# Patient Record
Sex: FEMALE | Race: WHITE | Employment: OTHER | URBAN - METROPOLITAN AREA
[De-identification: names, ages, dates, MRNs, and addresses within clinical notes are randomized per-mention and may not be internally consistent; named-entity substitution may affect disease eponyms.]

---

## 2017-06-06 ENCOUNTER — HOSPITAL ENCOUNTER (OUTPATIENT)
Dept: PHYSICAL THERAPY | Age: 77
Discharge: HOME | End: 2017-06-06
Payer: MEDICARE

## 2017-06-06 PROCEDURE — G8979 MOBILITY GOAL STATUS: HCPCS

## 2017-06-06 PROCEDURE — G8978 MOBILITY CURRENT STATUS: HCPCS

## 2017-06-06 PROCEDURE — 97162 PT EVAL MOD COMPLEX 30 MIN: CPT

## 2017-06-06 PROCEDURE — 97110 THERAPEUTIC EXERCISES: CPT

## 2017-06-06 NOTE — PROGRESS NOTES
PHYSICAL THERAPY - DAILY TREATMENT NOTE    Patient Name: Suhail Forde        Date: 2017  : 1940   YES Patient  Verified  Visit #:     Insurance: Payor: Jose Jenkins / Plan: [place] MEDICARE PART A & B / Product Type: Medicare /      In time: 335 Out time: 400   Total Treatment Time: 25     Medicare Time Tracking (below)   Total Timed Codes (min):  25 1:1 Treatment Time:  25     TREATMENT AREA =  (B) knee pain chronic    SUBJECTIVE    Pain Level (on 0 to 10 scale):  ie  / 10   Medication Changes/New allergies or changes in medical history, any new surgeries or procedures? NO    If yes, update Summary List   Subjective Functional Status/Changes:  []  No changes reported     See POC          OBJECTIVE     min Patient Education:  YES  Reviewed HEP   []  Progressed/Changed HEP based on: Other Objective/Functional Measures:    Shown and Performed HEP.      Post Treatment Pain Level (on 0 to 10) scale:   ie  / 10     ASSESSMENT    []  See Progress Note/Recertification   Patient will continue to benefit from skilled therapy to address remaining functional deficits: See POC   Progress toward goals / Updated goals:    See POC     PLAN    []  Upgrade activities as tolerated YES Continue plan of care   []  Discharge due to :    []  Other:      Therapist: Carlos Barrientos DPT, CIMT    Date: 2017 Time: 4:14 PM

## 2017-06-06 NOTE — PROGRESS NOTES
[facility] PHYSICAL THERAPY  [address] [place] [address], [address],[place], [address] [facility] - Phone: [phone]  Fax: [phone] / [address] [facility]  Patient Name: Louann Gaspar : 1940   Treatment   Diagnosis: (B) knee pain  Medical   Diagnosis: Bilateral knee pain [M25.561, M25.562]  Osteoarthritis of both knees [M17.0]   Onset Date: Chronic     Referral Source: Chandrika Torres Start of Care [facility]): 2017   Prior Hospitalization: See medical history Provider #: [id number]   Prior Level of Function: Limited ADLs and leisure activities due to chronic LBP   Comorbidities: OA. HTN, Hypothyroidism, Heart Disease    Medications: Verified on Patient Summary List   The Plan of Care and following information is based on the information from the initial evaluation.   ==================================================================================  Assessment / key information: Pt is a 78 yo female presents with hx of chronic knee pain . She presents with pain ranging from 0-6/10, located (B) knees. Pain is made worse with activity, transitional movements, better with rest, meds. Palpation reveals TTP increase to medial joint line, peripatellar region, rectus femoris, medial hamstring. Gait: antalgic with decreased weight bearing (R) > (L). Knee AROM/PROM: flexion 105 deg, extension 0 deg. Ankle AROM DF:  0 deg. LE MMT: hip flex 4/5, abd 3/5, add 4/5, ext 3/5, knee flex 4/5, ext 4/5, ankle 4/5. Sensation is decreased to light touch in the LE for stocking distribution. Patellar mobility is hypomobile for inferior and medial glide, medial tilt. HS 90/90 25 deg.  (+) Special tests include: Na. (-) Special tests include: Na. Pt is limited in the following activities: squatting, stooping, kneeling, performing ADLs.   Pt will benefit from PT interventions to address the aforementioned deficits and allow pt to return to PLOF. Eval Complexity: History HIGH Complexity :3+ comorbidities / personal factors will impact the outcome/ POC ;  Examination  MEDIUM Complexity : 3 Standardized tests and measures addressing body structure, function, activity limitation and / or participation in recreation ; Presentation MEDIUM Complexity : Evolving with changing characteristics ; Decision Making MEDIUM Complexity : FOTO score of 26-74; Overall Complexity MEDIUM   ==================================================================================  Problem List: pain affecting function, decrease ROM, decrease strength, impaired gait/ balance, decrease ADL/ functional abilitiies, decrease activity tolerance, decrease flexibility/ joint mobility and decrease transfer abilities   Treatment Plan may include any combination of the following: Therapeutic exercise, Therapeutic activities, Neuromuscular re-education, Physical agent/modality, Gait/balance training, Manual therapy and Patient education  Patient / Family readiness to learn indicated by: asking questions, trying to perform skills and interest  Persons(s) to be included in education: patient (P)  Barriers to Learning/Limitations: no  Measures taken:    Patient Goal (s): Decrease pain   Patient self reported health status: fair  Rehabilitation Potential: fair   Short Term Goals: To be accomplished in  2-3  weeks:  1. Pt will be independent and compliant with HEP to decrease pain, increase ROM and return pt to PLOF. 2. Pt will note pain less than or equal to 5/10 at worst to allow increase in functional abilities. 3. Pt will demonstrates increase in standing tolerance > 30 mins to allow ability to perform ADLs   Long Term Goals: To be accomplished in  4-6  weeks:  1. Increase score on FOTO by > or = to 8 points to demo an increase in functional activity tolerance with the LE. 2. Pt will note < or = 2/10 pain with all mobility to improve comfort with ADLs.    3. Pt will demonstrate GROC >/= 4+ to allow increase in ADL tolerance   Frequency / Duration:   Patient to be seen  2-3  times per week for 4-6  weeks:  Patient / Caregiver education and instruction: self care, activity modification and exercises  G-Codes (GP): Mobility [id number] Current  CL= 60-79%   Goal  CK= 40-59%. The severity rating is based on the FOTO Score    Therapist Signature: Chetan Swift DPT, CIMT Date: 9/3/2057   Certification Period: 6/6/17-8/29/17 Time: 4:00 PM   ===========================================================================================  I certify that the above Physical Therapy Services are being furnished while the patient is under my care. I agree with the treatment plan and certify that this therapy is necessary. Physician Signature:        Date:       Time:     Please sign and return to [facility] or you may fax the signed copy to [phone]. Thank you.

## 2017-06-09 ENCOUNTER — HOSPITAL ENCOUNTER (OUTPATIENT)
Dept: PHYSICAL THERAPY | Age: 77
Discharge: HOME | End: 2017-06-09
Payer: MEDICARE

## 2017-06-09 PROCEDURE — 97140 MANUAL THERAPY 1/> REGIONS: CPT

## 2017-06-09 PROCEDURE — 97110 THERAPEUTIC EXERCISES: CPT

## 2017-06-09 NOTE — PROGRESS NOTES
PHYSICAL THERAPY - DAILY TREATMENT NOTE    Patient Name: Jamila Wells        Date: 2017  : 1940   YES Patient  Verified  Visit #:      of   12  Insurance: Payor: Sharmin Shake / Plan: [place] MEDICARE PART A & B / Product Type: Medicare /      In time: 315 Out time: 4   Total Treatment Time: 45     Medicare Time Tracking (below)   Total Timed Codes (min):  45 1:1 Treatment Time:  45     TREATMENT AREA =  Bilateral knee pain [M25.561, M25.562]  Osteoarthritis of both knees [M17.0]    SUBJECTIVE  Pain Level (on 0 to 10 scale):  2  / 10   Medication Changes/New allergies or changes in medical history, any new surgeries or procedures? NO    If yes, update Summary List   Subjective Functional Status/Changes:  []  No changes reported     Pt states she can usually maintain her breathing and not have any trouble. Did not bring in any breathing apparatus and states that she does not consistently use a breathing apparatus. OBJECTIVE  35 min Therapeutic Exercise:  [x]  See flow sheet   Rationale:      increase ROM, increase strength, improve coordination and improve balance to improve the patients ability to perform pain free ADLs. 10 Min Manual Therapy: STM (B) quad and HS. Rationale:      decrease pain, increase ROM and increase tissue extensibility to improve patient's ability to ambulate. min Patient Education:  YES  Reviewed HEP   []  Progressed/Changed HEP based on: Other Objective/Functional Measures: Added multiple exercises to improve LE strength and stability for ADLs. See flow sheet. TTP along (B) quad, (R) > (L). Pulse ox taken throughout session, no reading below [id number]     Post Treatment Pain Level (on 0 to 10) scale:   2  / 10     ASSESSMENT  Assessment/Changes in Function:     Good tolerance to initial therex.        []  See Progress Note/Recertification   Patient will continue to benefit from skilled PT services to modify and progress therapeutic interventions, address functional mobility deficits, address ROM deficits, address strength deficits, analyze and address soft tissue restrictions, analyze and cue movement patterns, analyze and modify body mechanics/ergonomics and assess and modify postural abnormalities to attain remaining goals. Progress toward goals / Updated goals:    Initiated therex.        PLAN  [x]  Upgrade activities as tolerated YES Continue plan of care   []  Discharge due to :    []  Other:      Therapist: Tamy Deutsch PTA    Date: 6/9/2017 Time: 4:35 PM     Future Appointments  Date Time Provider Reba Goins   6/19/2017 3:30 PM Joaquin Cevallos [facility]   6/21/2017 3:30 PM Joaquin Cevallos [facility]   6/23/2017 11:30 AM Tamy Deutsch [facility]   6/26/2017 2:30 PM Joaquin Cevallos [facility]   6/28/2017 3:30 PM Joaquin Cevallos [facility]   6/30/2017 2:30 PM Isabelle Gold, PT [facility]

## 2017-06-19 ENCOUNTER — APPOINTMENT (OUTPATIENT)
Dept: PHYSICAL THERAPY | Age: 77
End: 2017-06-19
Payer: MEDICARE

## 2017-06-21 ENCOUNTER — HOSPITAL ENCOUNTER (OUTPATIENT)
Dept: PHYSICAL THERAPY | Age: 77
Discharge: HOME | End: 2017-06-21
Payer: MEDICARE

## 2017-06-21 PROCEDURE — 97110 THERAPEUTIC EXERCISES: CPT

## 2017-06-21 PROCEDURE — 97140 MANUAL THERAPY 1/> REGIONS: CPT

## 2017-06-21 NOTE — PROGRESS NOTES
PHYSICAL THERAPY - DAILY TREATMENT NOTE    Patient Name: Becky Regan        Date: 2017  : 1940   YES Patient  Verified  Visit #:   3   of   12  Insurance: Payor: Cathleen Camel / Plan: [place] MEDICARE PART A & B / Product Type: Medicare /      In time: 330 Out time: 410   Total Treatment Time: 40     Medicare Time Tracking (below)   Total Timed Codes (min):  40 1:1 Treatment Time:  40     TREATMENT AREA =  Bilateral knee pain [M25.561, M25.562]  Osteoarthritis of both knees [M17.0]    SUBJECTIVE  Pain Level (on 0 to 10 scale):  0  / 10   Medication Changes/New allergies or changes in medical history, any new surgeries or procedures? NO    If yes, update Summary List   Subjective Functional Status/Changes:  []  No changes reported     Reports no pain in knees but very stiff. Patient walked into clinic without use of supplemental oxygen. Patient stated that she cold control her breathing for intake of O2         OBJECTIVE    30 min Therapeutic Exercise:  [x]  See flow sheet   Rationale:      increase ROM and increase strength to improve the patients ability to perform pain free ADLs. 10 min Manual Therapy: STM (B) quad and HS. Rationale:      decrease pain, increase ROM and increase tissue extensibility to improve patient's ability to perform pain free ADLs. min Patient Education:  YES  Reviewed HEP   []  Progressed/Changed HEP based on: Other Objective/Functional Measures:    Continue with therx per flow sheet. Patient tender to posterior and anterior portions of B knee. Post Treatment Pain Level (on 0 to 10) scale:   0  / 10     ASSESSMENT  Assessment/Changes in Function:     No change in function noted today.    []  See Progress Note/Recertification   Patient will continue to benefit from skilled PT services to modify and progress therapeutic interventions, address functional mobility deficits, address ROM deficits, address strength deficits, analyze and address soft tissue restrictions and analyze and cue movement patterns to attain remaining goals.    Progress toward goals / Updated goals:    No change toward goals today     PLAN  []  Upgrade activities as tolerated YES Continue plan of care   []  Discharge due to :    []  Other:      Therapist: URBANO Badillo    Date: 6/21/2017 Time: 7:03 AM       Future Appointments  Date Time Provider Reba Goins   6/21/2017 3:30 PM Melia De Jesus [facility]   6/23/2017 11:30 AM Tamy Deutsch [facility]   6/26/2017 2:30 PM Joaquin Cevallos PTA [facility]   6/28/2017 3:30 PM Joaquin Cevallos PTA [facility]   6/30/2017 2:30 PM Isabelle Gold, PT [facility]

## 2017-06-23 ENCOUNTER — HOSPITAL ENCOUNTER (OUTPATIENT)
Dept: PHYSICAL THERAPY | Age: 77
Discharge: HOME | End: 2017-06-23
Payer: MEDICARE

## 2017-06-23 PROCEDURE — 97110 THERAPEUTIC EXERCISES: CPT

## 2017-06-23 PROCEDURE — 97140 MANUAL THERAPY 1/> REGIONS: CPT

## 2017-06-23 NOTE — PROGRESS NOTES
PHYSICAL THERAPY - DAILY TREATMENT NOTE    Patient Name: Rene Boyer        Date: 2017  : 1940   YES Patient  Verified  Visit #:      12  Insurance: Payor: Camnetta Dancer / Plan: [place] MEDICARE PART A & B / Product Type: Medicare /      In time: 6673 Out time:    Total Treatment Time: 55     Medicare Time Tracking (below)   Total Timed Codes (min):  45 1:1 Treatment Time:  40     TREATMENT AREA =  Bilateral knee pain [M25.561, M25.562]  Osteoarthritis of both knees [M17.0]    SUBJECTIVE  Pain Level (on 0 to 10 scale):  5  / 10   Medication Changes/New allergies or changes in medical history, any new surgeries or procedures? NO    If yes, update Summary List   Subjective Functional Status/Changes:  []  No changes reported     Pt reporting some soreness after the last session and it's still lingering a little today. OBJECTIVE  Modalities Rationale:     decrease inflammation and decrease pain to improve patient's ability to perform pain free ADLs. min [] Estim, type/location:                                      []  att     []  unatt     []  w/US     []  w/ice    []  w/heat    min []  Mechanical Traction: type/lbs                   []  pro   []  sup   []  int   []  cont    []  before manual    []  after manual    min []  Ultrasound, settings/location:      min []  Iontophoresis w/ dexamethasone, location:                                               []  take home patch       []  in clinic   10 min [x]  Ice     []  Heat    location/position: Supine, (B) knees. min []  Vasopneumatic Device, press/temp:     min []  Other:    [x] Skin assessment post-treatment (if applicable):    [x]  intact    []  redness- no adverse reaction     []redness  adverse reaction:        35 (30) min Therapeutic Exercise:  [x]  See flow sheet   Rationale:      increase ROM, increase strength, improve coordination and improve balance to improve the patients ability to ambulate.       [address] Therapy: STM/DTM (B) hip flexor, quads. Rationale:      decrease pain, increase ROM, increase tissue extensibility and decrease trigger points to improve patient's ability to perform pain free ADLs. min Patient Education:  YES  Reviewed HEP   []  Progressed/Changed HEP based on: Other Objective/Functional Measures:    No changes to therex from previous session. Pt ambulating with SPC  No difficulty with breathing per pt. Post Treatment Pain Level (on 0 to 10) scale:   4  / 10     ASSESSMENT  Assessment/Changes in Function:     TTP along (B) quads/hip flexor. Minimal VC's required for therex. Pt prefers using a sheet for the HS stretch - green strap hurts the hands. []  See Progress Note/Recertification   Patient will continue to benefit from skilled PT services to modify and progress therapeutic interventions, address functional mobility deficits, address ROM deficits, address strength deficits, analyze and address soft tissue restrictions, analyze and cue movement patterns, analyze and modify body mechanics/ergonomics and assess and modify postural abnormalities to attain remaining goals. Progress toward goals / Updated goals:    No changes in progress toward ltg's with today's session.       PLAN  [x]  Upgrade activities as tolerated YES Continue plan of care   []  Discharge due to :    []  Other:      Therapist: Yamila Mascorro PTA    Date: 6/23/2017 Time: 11:33 AM     Future Appointments  Date Time Provider Reba Goins   6/26/2017 2:30 PM Valencia Claude [facility]   6/30/2017 2:30 PM Dominique Mckinnon PT [facility]

## 2017-06-26 ENCOUNTER — HOSPITAL ENCOUNTER (OUTPATIENT)
Dept: PHYSICAL THERAPY | Age: 77
Discharge: HOME | End: 2017-06-26
Payer: MEDICARE

## 2017-06-26 PROCEDURE — 97110 THERAPEUTIC EXERCISES: CPT

## 2017-06-26 PROCEDURE — 97140 MANUAL THERAPY 1/> REGIONS: CPT

## 2017-06-26 NOTE — PROGRESS NOTES
PHYSICAL THERAPY - DAILY TREATMENT NOTE    Patient Name: Cesilia Cortez        Date: 2017  : 1940   YES Patient  Verified  Visit #:      of   12  Insurance: Payor: Prakash Sims / Plan: [place] MEDICARE PART A & B / Product Type: Medicare /      In time: 230 Out time: 330   Total Treatment Time: 60     Medicare Time Tracking (below)   Total Timed Codes (min):  60 1:1 Treatment Time:  60     TREATMENT AREA =  Bilateral knee pain [M25.561, M25.562]  Osteoarthritis of both knees [M17.0]    SUBJECTIVE  Pain Level (on 0 to 10 scale): 4 / 10   Medication Changes/New allergies or changes in medical history, any new surgeries or procedures? NO    If yes, update Summary List   Subjective Functional Status/Changes:  []  No changes reported     I was sore after last session on Friday,  Today I took some tylenol before I came. OBJECTIVE    45 min Therapeutic Exercise:  [x]  See flow sheet   Rationale:      increase ROM and increase strength to improve the patients ability to  perform pain free ADLs    15 min Manual Therapy: STM/DTM (B) hip flexor, quads. Rationale:      decrease pain, increase ROM and increase tissue extensibility to improve patient's ability to perform pain free ADLs     min Patient Education:  YES  Reviewed HEP   []  Progressed/Changed HEP based on: Other Objective/Functional Measures:    Continues with increase HS tightness. Post Treatment Pain Level (on 0 to 10) scale:   0  / 10     ASSESSMENT  Assessment/Changes in Function:     Add table squat 10 x 2 with good tolerance. []  See Progress Note/Recertification   Patient will continue to benefit from skilled PT services to modify and progress therapeutic interventions, address functional mobility deficits, address ROM deficits, address strength deficits, analyze and address soft tissue restrictions and analyze and cue movement patterns to attain remaining goals.    Progress toward goals / Updated goals:    Assess progression toward goals next visit.      PLAN  []  Upgrade activities as tolerated YES Continue plan of care   []  Discharge due to :    []  Other:      Therapist: URBANO Curtis    Date: 6/26/2017 Time: 7:05 AM       Future Appointments  Date Time Provider Reba Goins   6/26/2017 2:30 PM Neelam Amos [facility]   6/30/2017 2:30 PM Lorenzo Martinez PT [facility]

## 2017-06-28 ENCOUNTER — APPOINTMENT (OUTPATIENT)
Dept: PHYSICAL THERAPY | Age: 77
End: 2017-06-28
Payer: MEDICARE

## 2017-06-30 ENCOUNTER — HOSPITAL ENCOUNTER (OUTPATIENT)
Dept: PHYSICAL THERAPY | Age: 77
Discharge: HOME | End: 2017-06-30
Payer: MEDICARE

## 2017-06-30 PROCEDURE — 97110 THERAPEUTIC EXERCISES: CPT

## 2017-06-30 PROCEDURE — 97140 MANUAL THERAPY 1/> REGIONS: CPT

## 2017-06-30 NOTE — PROGRESS NOTES
PHYSICAL THERAPY - DAILY TREATMENT NOTE    Patient Name: Amina Ramírez        Date: 2017  : 1940   YES Patient  Verified  Visit #:     Insurance: Payor: Mark Schulte / Plan: [place] MEDICARE PART A & B / Product Type: Medicare /      In time: 220 Out time: 305   Total Treatment Time: 45     Medicare Time Tracking (below)   Total Timed Codes (min):  45 1:1 Treatment Time:  45     TREATMENT AREA = Bilateral knee pain [M25.561, M25.562]  Osteoarthritis of both knees [M17.0]    SUBJECTIVE  Pain Level (on 0 to 10 scale):  1  / 10   Medication Changes/New allergies or changes in medical history, any new surgeries or procedures?     NO    If yes, update Summary List   Subjective Functional Status/Changes:  []  No changes reported     Reports soreness in (B) LEs, predominately in (L) > (R)  knee         OBJECTIVE  Modalities Rationale:      to improve patient's ability to    min [] Estim, type/location:                                      []  att     []  unatt     []  w/US     []  w/ice    []  w/heat    min []  Mechanical Traction: type/lbs                   []  pro   []  sup   []  int   []  cont    []  before manual    []  after manual    min []  Ultrasound, settings/location:      min []  Iontophoresis w/ dexamethasone, location:                                               []  take home patch       []  in clinic    min []  Ice     []  Heat    location/position:     min []  Vasopneumatic Device, press/temp:     min []  Other:    [] Skin assessment post-treatment (if applicable):    []  intact    []  redness- no adverse reaction     []redness  adverse reaction:        35 min Therapeutic Exercise:  [x]  See flow sheet   Rationale:      increase ROM and increase strength to improve the patients ability to perform ADLS     10 min Manual Therapy: (B) DTM/STM (B) hip flexor, Quads   Rationale:      decrease pain, increase ROM and increase tissue extensibility to improve patient's ability to perform ADLS     min Patient Education:  YES  Reviewed HEP   []  Progressed/Changed HEP based on: Other Objective/Functional Measures:    No change to therx this visit. Presents without use of AD, required few rest breaks during visit today to \"catch breath\"      Post Treatment Pain Level (on 0 to 10) scale:   0  / 10     ASSESSMENT  Assessment/Changes in Function:     Con't with TTP to (B) LE at quadriceps region     FOTO 34pts, GROC 3+     []  See Progress Note/Recertification   Patient will continue to benefit from skilled PT services to modify and progress therapeutic interventions, address functional mobility deficits, address ROM deficits, address strength deficits and analyze and address soft tissue restrictions to attain remaining goals. Progress toward goals / Updated goals:    Progressing with goals,.  Pt to take 2 week vacation in up coming week     PLAN  []  Upgrade activities as tolerated YES Continue plan of care   []  Discharge due to :    []  Other:      Therapist: Silas Elena DPT, CIMT    Date: 6/30/2017 Time: 2:31 PM       Future Appointments  Date Time Provider Reba Goins   7/3/2017 10:30 AM Valentín Estevez PTA [facility]   7/5/2017 10:30 AM Valentín Estevez PTA [facility]   7/6/2017 11:00 AM Valentín Estevez PTA [facility]

## 2017-07-03 ENCOUNTER — HOSPITAL ENCOUNTER (OUTPATIENT)
Dept: PHYSICAL THERAPY | Age: 77
Discharge: HOME | End: 2017-07-03
Payer: MEDICARE

## 2017-07-03 PROCEDURE — G8978 MOBILITY CURRENT STATUS: HCPCS

## 2017-07-03 PROCEDURE — 97110 THERAPEUTIC EXERCISES: CPT

## 2017-07-03 PROCEDURE — G8979 MOBILITY GOAL STATUS: HCPCS

## 2017-07-03 PROCEDURE — 97140 MANUAL THERAPY 1/> REGIONS: CPT

## 2017-07-03 NOTE — PROGRESS NOTES
PHYSICAL THERAPY - DAILY TREATMENT NOTE    Patient Name: Gloria Anders        Date: 7/3/2017  : 1940   YES Patient  Verified  Visit #:     Insurance: Payor: Johanna Petit / Plan: [place] MEDICARE PART A & B / Product Type: Medicare /      In time: 8347 Out time: 8591   Total Treatment Time: 45     Medicare Time Tracking (below)   Total Timed Codes (min):  45 1:1 Treatment Time:  45     TREATMENT AREA =   Bilateral knee pain [M25.561, M25.562]  Osteoarthritis of both knees [M17.0]    SUBJECTIVE  Pain Level (on 0 to 10 scale):  2  / 10   Medication Changes/New allergies or changes in medical history, any new surgeries or procedures? NO    If yes, update Summary List   Subjective Functional Status/Changes:  []  No changes reported     I would like to continue because I have been feeling better overall and my knees haven't been as bad. OBJECTIVE    35 min Therapeutic Exercise:  [x]  See flow sheet   Rationale:      increase ROM and increase strength to improve the patients ability to perform pain free ADLs    10 min Manual Therapy: HS and quad stretch PROM   Rationale:      decrease pain, increase ROM and increase tissue extensibility to improve patient's ability to perform pain free ADLs     min Patient Education:  YES  Reviewed HEP   []  Progressed/Changed HEP based on: Other Objective/Functional Measures:    Worse p! 3/10, least p! 0/10, average p! 1/10.  ~20 minutes for standing tolerance. Add SLR (B) 10 x 5 seconds.      Post Treatment Pain Level (on 0 to 10) scale:   0-1  / 10     ASSESSMENT  Assessment/Changes in Function:     See recert     []  See Progress Note/Recertification   Patient will continue to benefit from skilled PT services to modify and progress therapeutic interventions, address functional mobility deficits, address ROM deficits, address strength deficits, analyze and address soft tissue restrictions and analyze and cue movement patterns to attain remaining goals.   Progress toward goals / Updated goals:  See recert     PLAN  []  Upgrade activities as tolerated YES Continue plan of care   []  Discharge due to :    []  Other:      Therapist: URBANO Moffett    Date: 7/3/2017 Time: 6:58 AM       Future Appointments  Date Time Provider Reba Goins   7/3/2017 10:30 AM Funmi Elena PTA [facility]   7/5/2017 10:30 AM Funmi Elena PTA [facility]   7/6/2017 11:00 AM Funmi Elena PTA [facility]

## 2017-07-03 NOTE — PROGRESS NOTES
[address] [place] [address] PHYSICAL THERAPY  [address] [place] [address] 51, [address],[facility], [address] - Phone: [phone]  Fax: [phone]  CONTINUED PLAN OF CARE/RECERTIFICATION FOR PHYSICAL THERAPY          Patient Name: Chery Clayton : 1940   Medical/Treatment Diagnosis: Bilateral knee pain [M25.561, M25.562]  Osteoarthritis of both knees [M17.0]   Onset Date: Chronic    Referral Source: Reji Schwartz Start of Care [facility]): 2017   Prior Hospitalization: See Medical History Provider #: [id number]   Prior Level of Function: Limited ADLs and leisure activities due to chronic LBP   Comorbidities: OA. HTN, Hypothyroidism, Heart Disease    Medications: Verified on Patient Summary List   Visits from [facility]: 7 Missed Visits: 0     Goal/Measure of Progress Goal Met? 1. Increase score on FOTO by > or = to 8 points to demo an increase in functional activity tolerance with the LE. Status at last Eval: 34 Current Status: 45 Yes: 11 point improvement   2. Pt will note < or = 2/10 pain with all mobility to improve comfort with ADLs. Status at last Eval: pain ranging from 0-6/10, located (B) knees Current Status: Worse p! 3/10, least p! 0/10, average p! 1/10. Progressing well   3. Pt will demonstrate GROC >/= 4+ to allow increase in ADL tolerance    Status at last Eval:  Current Status: +3 somewhat better Progressing well. Key Functional Changes/Progress: Patient has been progressing well toward all goals. Patient reports that she is able to stand ~ 20 minutes with decrease pain in knees and improved tolerance. Patient continues to report soreness in (B) LEs, predominately in (L) > (R)  knee. Patient has presented without use of AD when arriving at therapy for the past 2 visits. Patient reports improved compliance with HEP.     Problem List: pain affecting function, decrease ROM, decrease strength, decrease ADL/ functional abilitiies, decrease activity tolerance and decrease flexibility/ joint mobility   Treatment Plan may include any combination of the following: Therapeutic exercise, Physical agent/modality, Gait/balance training, Manual therapy, Patient education, Functional mobility training and Stair training  Patient Goal(s) has been updated and includes:  Decrease pain    Goals for this certification period include and are to be achieved in   4  weeks:  1. Increase score on FOTO by > or = to 50 to demo an increase in functional activity tolerance with the LE. 2. Continue with goals 2 and 3 above. Frequency / Duration:   Patient to be seen   2   times per week for   4    weeks:  G-Codes (GP): Mobility [id number] Current  CK= 40-59%   Goal  CK= 40-59%. The severity rating is based on the FOTO Score    Assessments/Recommendations: we would like to continue with treatment to progress patient further with strength and endurance to perform prolonged activities with decrease knee pain. Please advise. Thank you. If you have any questions/comments please contact us directly at [phone]. Thank you for allowing us to assist in the care of your patient. Therapist Signature: Mackey Kawasaki, LPTA/Hilario Jacobs DPT, CIMT Date: 3/8/7984   Certification Period:  Reporting Period: 6/6/17-8/29/17 6/6/17 - 7/3/17 Time: 11:27 AM   NOTE TO PHYSICIAN:  PLEASE COMPLETE THE ORDERS BELOW AND FAX TO   [facility] Physical Therapy: [phone]  If you are unable to process this request in 24 hours please contact our office: [phone]    ___ I have read the above report and request that my patient continue as recommended.   ___ I have read the above report and request that my patient continue therapy with the following changes/special instructions: ________________________________________________   ___ I have read the above report and request that my patient be discharged from therapy.      Physician Signature:        Date:       Time:

## 2017-07-05 ENCOUNTER — HOSPITAL ENCOUNTER (OUTPATIENT)
Dept: PHYSICAL THERAPY | Age: 77
Discharge: HOME | End: 2017-07-05
Payer: MEDICARE

## 2017-07-05 PROCEDURE — 97110 THERAPEUTIC EXERCISES: CPT

## 2017-07-05 NOTE — PROGRESS NOTES
PHYSICAL THERAPY - DAILY TREATMENT NOTE    Patient Name: Azar Spine        Date: 2017  : 1940   YES Patient  Verified  Visit #:     Insurance: Payor: [place] MED[place]ARE / Plan: [place] MEDICARE PART A & B / Product Type: Medicare /      In time:  Out time: 1110   Total Treatment Time: 45     Medicare Time Tracking (below)   Total Timed Codes (min):  45 1:1 Treatment Time:  45     TREATMENT AREA =  Bilateral knee pain [M25.561, M25.562]  Osteoarthritis of both knees [M17.0]       SUBJECTIVE  Pain Level (on 0 to 10 scale):  0  / 10   Medication Changes/New allergies or changes in medical history, any new surgeries or procedures? NO    If yes, update Summary List   Subjective Functional Status/Changes:  []  No changes reported     I did n't have to brace myself up after walking into the building and getting onto the elevator. Usually by the time I reach the elevator i'm done for          OBJECTIVE    45 min Therapeutic Exercise:  [x]  See flow sheet   Rationale:      increase ROM and increase strength to improve the patients ability to perform pain free ADLs         min Patient Education:  YES  Reviewed HEP   []  Progressed/Changed HEP based on: Other Objective/Functional Measures:    Good symmetrical gait noted today and without use of AD. Post Treatment Pain Level (on 0 to 10) scale:   1  / 10     ASSESSMENT  Assessment/Changes in Function:     Add standing hip abd, flex/ext, HR/TR and marching 15 x each exercise. []  See Progress Note/Recertification   Patient will continue to benefit from skilled PT services to modify and progress therapeutic interventions, address functional mobility deficits, address ROM deficits, address strength deficits, analyze and address soft tissue restrictions and analyze and cue movement patterns to attain remaining goals. Progress toward goals / Updated goals:    No change toward new goals today.      PLAN  []  Upgrade activities as tolerated YES Continue plan of care   []  Discharge due to :    []  Other:      Therapist: URBANO Stewart    Date: 7/5/2017 Time: 6:53 AM       Future Appointments  Date Time Provider Reba Goins   7/5/2017 10:30 AM Solomon Carbajal [facility]   7/18/2017 1:30 PM Kenia Mantilla [facility]   7/20/2017 12:00 PM Yarelis Veloz PT [facility]   7/24/2017 11:00 AM Solomon Carbajal [facility]   7/26/2017 11:00 AM Kenia Mantilla [facility]   7/28/2017 10:30 AM Yarelis Veloz PT [facility]

## 2017-07-06 ENCOUNTER — APPOINTMENT (OUTPATIENT)
Dept: PHYSICAL THERAPY | Age: 77
End: 2017-07-06
Payer: MEDICARE

## 2017-07-18 ENCOUNTER — HOSPITAL ENCOUNTER (OUTPATIENT)
Dept: PHYSICAL THERAPY | Age: 77
Discharge: HOME | End: 2017-07-18
Payer: MEDICARE

## 2017-07-18 PROCEDURE — 97110 THERAPEUTIC EXERCISES: CPT

## 2017-07-18 NOTE — PROGRESS NOTES
PHYSICAL THERAPY - DAILY TREATMENT NOTE    Patient Name: Guy Kaur        Date: 2017  : 1940   YES Patient  Verified  Visit #:     Insurance: Payor: Darnell Rob / Plan: [place] MEDICARE PART A & B / Product Type: Medicare /      In time: 130 Out time: 210   Total Treatment Time: 40     Medicare Time Tracking (below)   Total Timed Codes (min):  40 1:1 Treatment Time:  40     TREATMENT AREA =  Bilateral knee pain [M25.561, M25.562]  Osteoarthritis of both knees [M17.0]    SUBJECTIVE  Pain Level (on 0 to 10 scale):  0   / 10   Medication Changes/New allergies or changes in medical history, any new surgeries or procedures? NO    If yes, update Summary List   Subjective Functional Status/Changes:  []  No changes reported     No c/o pain. No recent falls or LOB reported. \"I'm doing my breathing exercise - in through the nose, out through the mouth - all day. \"          OBJECTIVE    40 min Therapeutic Exercise:  [x]  See flow sheet   Rationale:      increase ROM, increase strength, improve coordination and improve balance to improve the patients ability to ambulate. min Patient Education:  YES  Reviewed HEP   []  Progressed/Changed HEP based on: Other Objective/Functional Measures: Therex per flow sheet. Post Treatment Pain Level (on 0 to 10) scale:   0  / 10     ASSESSMENT  Assessment/Changes in Function:     No exacerbation of pain with today's session. Minimal VC's required for therex. []  See Progress Note/Recertification   Patient will continue to benefit from skilled PT services to modify and progress therapeutic interventions, address functional mobility deficits, address ROM deficits, address strength deficits, analyze and address soft tissue restrictions, analyze and cue movement patterns, analyze and modify body mechanics/ergonomics and assess and modify postural abnormalities to attain remaining goals.    Progress toward goals / Updated goals:    No change in progress toward LTG's with today's session.       PLAN  [x]  Upgrade activities as tolerated YES Continue plan of care   []  Discharge due to :    []  Other:      Therapist: Clifford Cummings PTA    Date: 7/18/2017 Time: 1:38 PM     Future Appointments  Date Time Provider Reba Goins   7/20/2017 12:00 PM Heena Willams, PT [facility]   7/24/2017 11:00 AM Tyler Parkinson PTA [facility]   7/26/2017 11:00 AM Clifford Cummings PTA [facility]   7/28/2017 10:30 AM Clifford Cummings PTA [facility]

## 2017-07-20 ENCOUNTER — HOSPITAL ENCOUNTER (OUTPATIENT)
Dept: PHYSICAL THERAPY | Age: 77
Discharge: HOME | End: 2017-07-20
Payer: MEDICARE

## 2017-07-20 PROCEDURE — 97110 THERAPEUTIC EXERCISES: CPT

## 2017-07-20 NOTE — PROGRESS NOTES
PHYSICAL THERAPY - DAILY TREATMENT NOTE    Patient Name: Frankie Altman        Date: 2017  : 1940   YES Patient  Verified  Visit #:   10   of   20  Insurance: Payor: Salud Reese / Plan: [place] MEDICARE PART A & B / Product Type: Medicare /      In time: 1200 Out time: 4682   Total Treatment Time: 30       TREATMENT AREA = Bilateral knee pain [M25.561, M25.562]  Osteoarthritis of both knees [M17.0]    SUBJECTIVE  Pain Level (on 0 to 10 scale):  0  / 10   Medication Changes/New allergies or changes in medical history, any new surgeries or procedures? NO    If yes, update Summary List   Subjective Functional Status/Changes:  []  No changes reported     Reports mild soreness in (B) knees at onset of visit today          OBJECTIVE  Modalities Rationale:      to improve patient's ability to    min [] Estim, type/location:                                      []  att     []  unatt     []  w/US     []  w/ice    []  w/heat    min []  Mechanical Traction: type/lbs                   []  pro   []  sup   []  int   []  cont    []  before manual    []  after manual    min []  Ultrasound, settings/location:      min []  Iontophoresis w/ dexamethasone, location:                                               []  take home patch       []  in clinic    min []  Ice     []  Heat    location/position:     min []  Vasopneumatic Device, press/temp:     min []  Other:    [] Skin assessment post-treatment (if applicable):    []  intact    []  redness- no adverse reaction     []redness  adverse reaction:        30 min Therapeutic Exercise:  [x]  See flow sheet   Rationale:      increase ROM and increase strength to improve the patients ability to perform ADLs      min Patient Education:  YES  Reviewed HEP   []  Progressed/Changed HEP based on:         Other Objective/Functional Measures:    Demonstrates ability to perform 30 mins of continuous therx without need for rest break     Post Treatment Pain Level (on 0 to 10) scale: 0  / 10     ASSESSMENT  Assessment/Changes in Function:     Progressing with overall mobility, able to ambulate within clinic without use of AD, no increase in knee s/s after therx, chief compliant in mild SOB    Pt was taken to waiting room and given water, rested for 5 mins and then left clinic     []  See Progress Note/Recertification   Patient will continue to benefit from skilled PT services to modify and progress therapeutic interventions, address functional mobility deficits, address ROM deficits, address strength deficits and analyze and address soft tissue restrictions to attain remaining goals.    Progress toward goals / Updated goals:    Progressing towards goals, con't to progress therx as able to allow additional increase in ADLs tolerance      PLAN  []  Upgrade activities as tolerated YES Continue plan of care   []  Discharge due to :    []  Other:      Therapist: Chuck Arthur DPT, CIMT    Date: 7/20/2017 Time: 12:37 PM       Future Appointments  Date Time Provider Reba Goins   7/24/2017 11:00 AM Mauro Wilson [facility]   7/26/2017 11:00 AM Jose Ramon Edwards PTA [facility]   7/28/2017 10:30 AM Jose Ramon Edwards [facility]

## 2017-07-24 ENCOUNTER — HOSPITAL ENCOUNTER (OUTPATIENT)
Dept: PHYSICAL THERAPY | Age: 77
Discharge: HOME | End: 2017-07-24
Payer: MEDICARE

## 2017-07-24 PROCEDURE — 97535 SELF CARE MNGMENT TRAINING: CPT

## 2017-07-24 PROCEDURE — 97110 THERAPEUTIC EXERCISES: CPT

## 2017-07-24 NOTE — PROGRESS NOTES
PHYSICAL THERAPY - DAILY TREATMENT NOTE    Patient Name: Emiliana Andrade        Date: 2017  : 1940   YES Patient  Verified  Visit #:    Insurance: Payor: Fiona Velásquez / Plan: VA MEDICARE PART A & B / Product Type: Medicare /      In time: 6437 Out time: 1145   Total Treatment Time: 40     Medicare Time Tracking (below)   Total Timed Codes (min):   1:1 Treatment Time:       TREATMENT AREA =  Bilateral knee pain [M25.561, M25.562]  Osteoarthritis of both knees [M17.0]    SUBJECTIVE  Pain Level (on 0 to 10 scale):  0  / 10   Medication Changes/New allergies or changes in medical history, any new surgeries or procedures? NO    If yes, update Summary List   Subjective Functional Status/Changes:  []  No changes reported     I feel like my legs are stronger overall, but I had a hard time getting into a van. And sometimes my (R) knee gives me a problem and sometimes it's my (L) knee. OBJECTIVE      40 min Therapeutic Exercise:  [x]  See flow sheet   Rationale:      increase ROM and increase strength to improve the patients ability toperform ADLs      min Patient Education:  YES  Reviewed HEP   []  Progressed/Changed HEP based on: Other Objective/Functional Measures: Add step up on 6 inch step 10 x B. Add 2# to all standing therx. Post Treatment Pain Level (on 0 to 10) scale:   0  / 10     ASSESSMENT  Assessment/Changes in Function:     Discussed joining rec center for further exercising upon DC. Discussed using a step to get in and out of vans if needed. []  See Progress Note/Recertification   Patient will continue to benefit from skilled PT services to modify and progress therapeutic interventions, address functional mobility deficits, address ROM deficits, address strength deficits and analyze and cue movement patterns to attain remaining goals. Progress toward goals / Updated goals:    Anticipate Dc in 2 more visits. Discussed this with patient as well. PLAN  []  Upgrade activities as tolerated YES Continue plan of care   []  Discharge due to :    []  Other:      Therapist: URBANO Gregorio    Date: 7/24/2017 Time: 6:56 AM       Future Appointments  Date Time Provider Reba Goins   7/24/2017 11:00 AM Mauro Wilson PTA [facility]   7/26/2017 11:00 AM Jose Ramon Edwards PTA [facility]   7/28/2017 10:30 AM Jose Ramon Edwards [facility]

## 2017-07-26 ENCOUNTER — HOSPITAL ENCOUNTER (OUTPATIENT)
Dept: PHYSICAL THERAPY | Age: 77
Discharge: HOME | End: 2017-07-26
Payer: MEDICARE

## 2017-07-26 PROCEDURE — 97110 THERAPEUTIC EXERCISES: CPT

## 2017-07-26 NOTE — PROGRESS NOTES
PHYSICAL THERAPY - DAILY TREATMENT NOTE    Patient Name: Chery Clayton        Date: 2017  : 1940   YES Patient  Verified  Visit #:     Insurance: Payor: Merrill Mims / Plan: VA MEDICARE PART A & B / Product Type: Medicare /      In time: 11 Out time:    Total Treatment Time: 35     Medicare Time Tracking (below)   Total Timed Codes (min):  35 1:1 Treatment Time:  25     TREATMENT AREA =  Bilateral knee pain [M25.561, M25.562]  Osteoarthritis of both knees [M17.0]    SUBJECTIVE  Pain Level (on 0 to 10 scale):  0  / 10   Medication Changes/New allergies or changes in medical history, any new surgeries or procedures? NO    If yes, update Summary List   Subjective Functional Status/Changes:  []  No changes reported     \"I think I've made some good progress. I spent a few years sitting on my butt, so it's been good to get moving again. \"          OBJECTIVE    35 (25) min Therapeutic Exercise:  [x]  See flow sheet   Rationale:      increase ROM, increase strength, improve coordination and improve balance to improve the patients ability to perform pain free ADLs. min Patient Education:  YES  Reviewed HEP   []  Progressed/Changed HEP based on: Other Objective/Functional Measures: Therex per flow sheet. Post Treatment Pain Level (on 0 to 10) scale:   0  / 10     ASSESSMENT  Assessment/Changes in Function:     Pt reporting improvement since beginning PT. Pt demonstrates(I) with therex. Pt well-prepared for DC to HEP. Plans to continue for 1 additional visit, then DC to HEP. []  See Progress Note/Recertification   Patient will continue to benefit from skilled PT services to modify and progress therapeutic interventions, address functional mobility deficits, address ROM deficits, address strength deficits, analyze and address soft tissue restrictions, analyze and cue movement patterns and analyze and modify body mechanics/ergonomics to attain remaining goals. Progress toward goals / Updated goals:    Pt indicating minimal pain. Progressing toward LTG #2.       PLAN  [x]  Upgrade activities as tolerated YES Continue plan of care   []  Discharge due to :    []  Other:      Therapist: Britta Summers PTA    Date: 7/26/2017 Time: 11:31 AM     Future Appointments  Date Time Provider Reba Goins   7/28/2017 10:30 AM Britta Summers PTA [facility]

## 2017-07-28 ENCOUNTER — HOSPITAL ENCOUNTER (OUTPATIENT)
Dept: PHYSICAL THERAPY | Age: 77
Discharge: HOME | End: 2017-07-28
Payer: MEDICARE

## 2017-07-28 PROCEDURE — 97110 THERAPEUTIC EXERCISES: CPT

## 2017-07-28 NOTE — PROGRESS NOTES
PHYSICAL THERAPY - DAILY TREATMENT NOTE    Patient Name: Oma Lora        Date: 2017  : 1940   YES Patient  Verified  Visit #:   [place]   of   20  Insurance: Payor: Renny Frankel / Plan: VA MEDICARE PART A & B / Product Type: Medicare /      In time: 7 Out time: 1120   Total Treatment Time: 45     Medicare Time Tracking (below)   Total Timed Codes (min):  45 1:1 Treatment Time:  30     TREATMENT AREA =  Bilateral knee pain [M25.561, M25.562]  Osteoarthritis of both knees [M17.0]    SUBJECTIVE  Pain Level (on 0 to 10 scale):  0  / 10   Medication Changes/New allergies or changes in medical history, any new surgeries or procedures? NO    If yes, update Summary List   Subjective Functional Status/Changes:  []  No changes reported     Pt reports compliance with current HEP. Is concerned about how much exercising she is allowed to do without aggravating her knee. States she is ready to be finished with PT. Pt reporting recent max pain of 1/10. OBJECTIVE  45 (30) min Therapeutic Exercise:  [x]  See flow sheet   Rationale:      increase ROM, increase strength, improve coordination and improve balance to improve the patients ability to perform pain free ADLs. min Patient Education:  YES  Reviewed HEP   []  Progressed/Changed HEP based on: Other Objective/Functional Measures:    FOTO = 47   GROC = +5     Post Treatment Pain Level (on 0 to 10) scale:   0  / 10     ASSESSMENT  Assessment/Changes in Function:     See DC      []  See Progress Note/Recertification   Patient will continue to benefit from skilled PT services to   Progress toward goals / Updated goals:    See DC      PLAN  []  Upgrade activities as tolerated No Continue plan of care   [x]  Discharge due to : Program complete   []  Other:      Therapist: Dameon Vicente PTA    Date: 2017 Time: 11:35 AM     No future appointments.

## 2017-07-28 NOTE — PROGRESS NOTES
[address] PHYSICAL THERAPY  [address] [place] [address] 51, [address],[facility], [facility] - Phone: [phone]  Fax: [phone] FOR PHYSICAL THERAPY          Patient Name: Ayanna Isabel : 1940   Treatment/Medical Diagnosis: Bilateral knee pain [M25.561, M25.562]  Osteoarthritis of both knees [M17.0]   Onset Date: chronic    Referral Source: Otto Concepcion Howell of Care [facility]): 2017   Prior Hospitalization: See Medical History Provider #: [id number]   Prior Level of Function: Limited ADLs and leisure activities due to chronic LBP   Comorbidities: OA. HTN, Hypothyroidism, Heart Disease    Medications: Verified on Patient Summary List   Visits from [facility]: 13 Missed Visits: 0     Goal/Measure of Progress Goal Met? 1. Increase score on FOTO by > or = to 50 to demo an increase in functional activity tolerance with the LE. Status at last Eval: 29 @ IE  Current Status: 47 current progressing   2. Pt will note < or = 2/10 pain with all mobility to improve comfort with ADLs. Status at last Eval: 6/10 max  Current Status: 1/10 yes   3. Pt will demonstrate GROC >/= 4+ to allow increase in ADL tolerance    Status at last Eval: +3  Current Status: +5  yes     Key Functional Changes/Progress: Pt presented to [facility] w/ hx of chronic knee pain. Pt currently reporting 1/10 max pain which alternates between (R) and (L) per pt. Pt demonstrates improvement in ability to perform functional activities as evidenced by improvement in FOTO and GROC scores. Pt no longer using AD to ambulate in the community and arrives for PT sessions without AD. Pt is (I) and compliant with HEP exercises. G-Codes (GP): Mobility  [id number] Goal  CK= 40-59% [id number] D/C  CK= 40-59%. The severity rating is based on the FOTO Score    Assessments/Recommendations: Discontinue therapy. Progressing towards or have reached established goals.     If you have any questions/comments please contact us directly at [phone]. Thank you for allowing us to assist in the care of your patient.     Therapist Signature: Dameon Vicente PTA/Hilario CHANDT, CIMT Date: 6/26/0586   Certification Period:   Reporting Period: 6/6/2017 - 8/29/2017   7/3/2017 - 7/28/2017 Time: 11:39 AM